# Patient Record
Sex: FEMALE | Race: WHITE | Employment: UNEMPLOYED | URBAN - METROPOLITAN AREA
[De-identification: names, ages, dates, MRNs, and addresses within clinical notes are randomized per-mention and may not be internally consistent; named-entity substitution may affect disease eponyms.]

---

## 2021-04-15 ENCOUNTER — OFFICE VISIT (OUTPATIENT)
Dept: FAMILY MEDICINE CLINIC | Age: 31
End: 2021-04-15
Payer: MEDICARE

## 2021-04-15 VITALS
SYSTOLIC BLOOD PRESSURE: 118 MMHG | TEMPERATURE: 97.9 F | HEART RATE: 75 BPM | OXYGEN SATURATION: 98 % | RESPIRATION RATE: 16 BRPM | HEIGHT: 62 IN | BODY MASS INDEX: 24.07 KG/M2 | DIASTOLIC BLOOD PRESSURE: 74 MMHG | WEIGHT: 130.8 LBS

## 2021-04-15 DIAGNOSIS — E55.9 VITAMIN D DEFICIENCY, UNSPECIFIED: ICD-10-CM

## 2021-04-15 DIAGNOSIS — Z12.4 SCREENING FOR CERVICAL CANCER: ICD-10-CM

## 2021-04-15 DIAGNOSIS — Z76.89 ENCOUNTER TO ESTABLISH CARE: Primary | ICD-10-CM

## 2021-04-15 DIAGNOSIS — G43.109 MIGRAINE WITH AURA AND WITHOUT STATUS MIGRAINOSUS, NOT INTRACTABLE: ICD-10-CM

## 2021-04-15 DIAGNOSIS — G89.29 CHRONIC LEFT SHOULDER PAIN: ICD-10-CM

## 2021-04-15 DIAGNOSIS — Z13.220 SCREENING FOR HYPERCHOLESTEROLEMIA: ICD-10-CM

## 2021-04-15 DIAGNOSIS — F17.200 TOBACCO DEPENDENCE: ICD-10-CM

## 2021-04-15 DIAGNOSIS — Z00.00 HEALTHCARE MAINTENANCE: ICD-10-CM

## 2021-04-15 DIAGNOSIS — L73.2 HIDRADENITIS SUPPURATIVA: ICD-10-CM

## 2021-04-15 DIAGNOSIS — M25.562 ACUTE PAIN OF LEFT KNEE: ICD-10-CM

## 2021-04-15 DIAGNOSIS — M25.512 CHRONIC LEFT SHOULDER PAIN: ICD-10-CM

## 2021-04-15 PROCEDURE — G8427 DOCREV CUR MEDS BY ELIG CLIN: HCPCS | Performed by: FAMILY MEDICINE

## 2021-04-15 PROCEDURE — 99204 OFFICE O/P NEW MOD 45 MIN: CPT | Performed by: FAMILY MEDICINE

## 2021-04-15 PROCEDURE — G8420 CALC BMI NORM PARAMETERS: HCPCS | Performed by: FAMILY MEDICINE

## 2021-04-15 PROCEDURE — 4004F PT TOBACCO SCREEN RCVD TLK: CPT | Performed by: FAMILY MEDICINE

## 2021-04-15 RX ORDER — IBUPROFEN 800 MG/1
800 TABLET ORAL EVERY 6 HOURS PRN
COMMUNITY
End: 2021-04-15 | Stop reason: SDUPTHER

## 2021-04-15 RX ORDER — IBUPROFEN 800 MG/1
800 TABLET ORAL EVERY 12 HOURS PRN
Qty: 120 TABLET | Refills: 0 | Status: SHIPPED
Start: 2021-04-15 | End: 2022-05-18 | Stop reason: SDUPTHER

## 2021-04-15 SDOH — ECONOMIC STABILITY: FOOD INSECURITY: WITHIN THE PAST 12 MONTHS, THE FOOD YOU BOUGHT JUST DIDN'T LAST AND YOU DIDN'T HAVE MONEY TO GET MORE.: NEVER TRUE

## 2021-04-15 ASSESSMENT — PATIENT HEALTH QUESTIONNAIRE - PHQ9
1. LITTLE INTEREST OR PLEASURE IN DOING THINGS: 0
SUM OF ALL RESPONSES TO PHQ QUESTIONS 1-9: 0
2. FEELING DOWN, DEPRESSED OR HOPELESS: 0
SUM OF ALL RESPONSES TO PHQ9 QUESTIONS 1 & 2: 0
SUM OF ALL RESPONSES TO PHQ QUESTIONS 1-9: 0
SUM OF ALL RESPONSES TO PHQ QUESTIONS 1-9: 0

## 2021-04-15 NOTE — PROGRESS NOTES
CC: Raul Walker is a 27 y.o. yo female here for evaluation of the following medical concerns: New Patient      HPI:    Establish care    Migraines with aura: 2-3x per month, goes behind the eye and temple and in the neck and shoots up to the eyes; can last for 1 day but do resolve with motrin; no photo/phonophobia; +light sensitivity  Tobacco use: Tried wellbutrin and chantix (had allergic reaction)   L shoudler tendonitis  L knee pain, swelling    ROS negative unless otherwise noted    Vitals:  /74   Pulse 75   Temp 97.9 °F (36.6 °C)   Resp 16   Ht 5' 2\" (1.575 m)   Wt 130 lb 12.8 oz (59.3 kg)   SpO2 98%   BMI 23.92 kg/m²   Wt Readings from Last 3 Encounters:   04/15/21 130 lb 12.8 oz (59.3 kg)       Physical Exam:  Constitutional - alert, well appearing, and in no distress  Eyes - extraocular eye movements intact, left eye normal, right eye normal, no conjunctivitis noted  Neck - supple, no significant adenopathy; thyroid exam: thyroid is normal in size without nodules or tenderness  Respiratory- clear to auscultation, no wheezes, rales or rhonchi, symmetric air entry; no increased work of breathing  Cardiovascular - normal rate, regular rhythm, normal S1, S2, no murmurs, rubs, clicks or gallops; Extremities - no edema noted  Abdomen - soft, nontender, nondistended  Musculoskeletal - gait normal; no clubbing, cyanosis of extremities noted; no joint deformity or swelling noted  Skin - normal coloration and turgor, no rashes, no suspicious skin lesions noted  Neurological - alert, oriented; no obvious CN deficits, normal speech, no obvious focal findings noted  Psychiatric - normal mood, behavior, speech, dress    Assessment / Plan   Diagnosis Orders   1. Encounter to establish care     2.  Migraine with aura and without status migrainosus, not intractable  ibuprofen (ADVIL;MOTRIN) 800 MG tablet    Riboflavin-Magnesium-Feverfew 100-90-25 MG TABS    CBC Auto Differential    Comprehensive Metabolic Panel    TSH without Reflex   3. Hidradenitis suppurativa  External Referral To Dermatology   4. Acute pain of left knee  XR KNEE LEFT (MIN 4 VIEWS)    XR SHOULDER LEFT (MIN 2 VIEWS)   5. Chronic left shoulder pain  XR SHOULDER LEFT (MIN 2 VIEWS)   6. Tobacco dependence     7. Screening for hypercholesterolemia  Lipid Panel   8. Vitamin D deficiency, unspecified  Vitamin D 25 Hydroxy   9. Healthcare maintenance  HEPATITIS C ANTIBODY    HIV-1 AND HIV-2 ANTIBODIES   10. Screening for cervical cancer  Rose Mary Mauro MD, OB/GYN, [place] ([facility])       Labs as ordered  Mg/b2 for migraine prevention; motrin for abortive  Xray shoulder and knee; recs to follow      RTO: Return in about 3 months (around 7/15/2021) for Test Results; knee and shoulder xray and chronic pain.         An electronic signature was used to authenticate this note.  ---- Deyanira Martines MD on 4/15/2021 at 5:29 PM

## 2021-06-14 DIAGNOSIS — G43.109 MIGRAINE WITH AURA AND WITHOUT STATUS MIGRAINOSUS, NOT INTRACTABLE: ICD-10-CM

## 2021-06-14 RX ORDER — IBUPROFEN 800 MG/1
800 TABLET ORAL EVERY 12 HOURS PRN
Qty: 60 TABLET | Refills: 1 | OUTPATIENT
Start: 2021-06-14

## 2021-10-12 ENCOUNTER — TELEPHONE (OUTPATIENT)
Dept: FAMILY MEDICINE CLINIC | Age: 31
End: 2021-10-12

## 2021-10-12 DIAGNOSIS — L73.2 HIDRADENITIS SUPPURATIVA: Primary | ICD-10-CM

## 2021-10-12 RX ORDER — SULFAMETHOXAZOLE AND TRIMETHOPRIM 800; 160 MG/1; MG/1
1 TABLET ORAL 2 TIMES DAILY
Qty: 14 TABLET | Refills: 0 | Status: SHIPPED | OUTPATIENT
Start: 2021-10-12 | End: 2021-10-19

## 2022-05-18 DIAGNOSIS — L73.2 HIDRADENITIS SUPPURATIVA: ICD-10-CM

## 2022-05-18 DIAGNOSIS — G43.109 MIGRAINE WITH AURA AND WITHOUT STATUS MIGRAINOSUS, NOT INTRACTABLE: ICD-10-CM

## 2022-05-18 RX ORDER — IBUPROFEN 800 MG/1
800 TABLET ORAL EVERY 12 HOURS PRN
Qty: 120 TABLET | Refills: 0 | Status: SHIPPED
Start: 2022-05-18 | End: 2022-09-23 | Stop reason: SDUPTHER

## 2022-06-14 ENCOUNTER — OFFICE VISIT (OUTPATIENT)
Dept: FAMILY MEDICINE CLINIC | Age: 32
End: 2022-06-14
Payer: MEDICARE

## 2022-06-14 VITALS
SYSTOLIC BLOOD PRESSURE: 102 MMHG | OXYGEN SATURATION: 99 % | HEART RATE: 78 BPM | TEMPERATURE: 98.4 F | BODY MASS INDEX: 22.23 KG/M2 | HEIGHT: 62 IN | DIASTOLIC BLOOD PRESSURE: 64 MMHG | WEIGHT: 120.8 LBS

## 2022-06-14 DIAGNOSIS — E55.9 VITAMIN D DEFICIENCY, UNSPECIFIED: ICD-10-CM

## 2022-06-14 DIAGNOSIS — R53.83 FATIGUE, UNSPECIFIED TYPE: ICD-10-CM

## 2022-06-14 DIAGNOSIS — Z11.1 SCREENING FOR TUBERCULOSIS: ICD-10-CM

## 2022-06-14 DIAGNOSIS — Z00.00 ENCOUNTER FOR PREVENTIVE CARE: Primary | ICD-10-CM

## 2022-06-14 DIAGNOSIS — Z12.4 SCREENING FOR CERVICAL CANCER: ICD-10-CM

## 2022-06-14 DIAGNOSIS — Z01.84 IMMUNITY STATUS TESTING: ICD-10-CM

## 2022-06-14 PROCEDURE — 99395 PREV VISIT EST AGE 18-39: CPT | Performed by: FAMILY MEDICINE

## 2022-06-14 SDOH — ECONOMIC STABILITY: FOOD INSECURITY: WITHIN THE PAST 12 MONTHS, YOU WORRIED THAT YOUR FOOD WOULD RUN OUT BEFORE YOU GOT MONEY TO BUY MORE.: NEVER TRUE

## 2022-06-14 SDOH — ECONOMIC STABILITY: FOOD INSECURITY: WITHIN THE PAST 12 MONTHS, THE FOOD YOU BOUGHT JUST DIDN'T LAST AND YOU DIDN'T HAVE MONEY TO GET MORE.: NEVER TRUE

## 2022-06-14 ASSESSMENT — SOCIAL DETERMINANTS OF HEALTH (SDOH): HOW HARD IS IT FOR YOU TO PAY FOR THE VERY BASICS LIKE FOOD, HOUSING, MEDICAL CARE, AND HEATING?: NOT VERY HARD

## 2022-06-14 ASSESSMENT — PATIENT HEALTH QUESTIONNAIRE - PHQ9
2. FEELING DOWN, DEPRESSED OR HOPELESS: 0
SUM OF ALL RESPONSES TO PHQ QUESTIONS 1-9: 0
SUM OF ALL RESPONSES TO PHQ QUESTIONS 1-9: 0
1. LITTLE INTEREST OR PLEASURE IN DOING THINGS: 0
SUM OF ALL RESPONSES TO PHQ QUESTIONS 1-9: 0
SUM OF ALL RESPONSES TO PHQ QUESTIONS 1-9: 0
SUM OF ALL RESPONSES TO PHQ9 QUESTIONS 1 & 2: 0

## 2022-06-14 NOTE — PROGRESS NOTES
CC: Devika Jain is a 32 y.o. 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)      HPI:      Starting nursing schools; required titers and vaccines  Migraines with aura: 2-3x per month  Tobacco use: Tried wellbutrin and chantix (had allergic reaction); some day smoker    Adult Screening:   Blood pressure normotensive: Yes    Obesity (BMI 30+): No   Diabetes screen: NA    Statin for CVD events and mortality preventive medication: see orders   Aspirin for CVD and colon cancer preventive medication: see orders   Diet to prevent CV disease (adults with cardiovascular risk factors): NA; Patient's cardiovascular risk history includes: none   Alcohol use:   reports no history of alcohol use.  Tobacco abuse:   reports that she has been smoking. She has been smoking about 0.50 packs per day. She has never used smokeless tobacco.  Depression screening: PHQ-9 Total Score: 0 (6/14/2022 11:00 AM)   Annual lung cancer screening: NA    Colorectal Cancer Screening, average risk (50-75yrs): NA    Hx of CRC before age 57yrs in FDR: NA    Hep C virus infection screening: NA    Fall prevention: NA     Sexually active specific:    Sexual activity: not currently; High risk behavior: none,  Sexually transmitted infections counseling: no    Female specific:   Folate supplementation (reproductive age): not on prenatal   Intimate partner violence concerns (reproductive age): safe    Cervical Cancer Screening (21 - 72 years): not UTD;  Hx abnormal PAP: no   BRCA risk assessment and genetic counseling/testing: no personal or family hx of breast, ovarian, tubal or peritoneal ca or ancestry associated with BRCA1/2 gene mutations    Breast Cancer Screening: NA    Osteoporosis screening in postmenopausal women younger than 72 years at increased risk of osteoporosis / 10-year FRAX risk of major osteoporotic fracture (MOF) (without DXA) greater than that of a 51-year-old white woman without major risk factors (8.4%): NA    Osteoporosis screening: women 72 years and older: NA         Health Maintenance Due   Topic Date Due    COVID-19 Vaccine (1) Never done    Pneumococcal 0-64 years Vaccine (1 - PCV) Never done    HIV screen  Never done    Hepatitis C screen  Never done    Cervical cancer screen  Never done     Immunization History   Administered Date(s) Administered    Tdap (Boostrix, Adacel) 06/15/2020       History reviewed. No pertinent past medical history. History reviewed. No pertinent surgical history. Family History   Problem Relation Age of Onset    Diabetes type 2  Mother     Cancer Father         tongue; non smoker     Social History     Tobacco Use    Smoking status: Current Some Day Smoker     Packs/day: 0.50    Smokeless tobacco: Never Used   Substance Use Topics    Alcohol use: Never    Drug use: Never     Allergies   Allergen Reactions    Chantix [Varenicline]      Prior to Admission medications    Medication Sig Start Date End Date Taking?  Authorizing Provider   ibuprofen (ADVIL;MOTRIN) 800 MG tablet Take 1 tablet by mouth every 12 hours as needed for Pain 5/18/22  Yes Nhung Gardiner MD       Vitals:  /64 (Site: Left Upper Arm, Position: Sitting, Cuff Size: Medium Adult)   Pulse 78   Temp 98.4 °F (36.9 °C) (Temporal)   Ht 5' 1.5\" (1.562 m)   Wt 120 lb 12.8 oz (54.8 kg)   LMP 05/25/2022 (Approximate)   SpO2 99%   BMI 22.46 kg/m²   Wt Readings from Last 3 Encounters:   06/14/22 120 lb 12.8 oz (54.8 kg)   04/15/21 130 lb 12.8 oz (59.3 kg)       Physical Exam:  Constitutional - alert, well appearing, and in no distress  Eyes - extraocular eye movements intact, left eye normal, right eye normal, no conjunctivitis noted  Neck - supple, no significant adenopathy; thyroid exam: thyroid is normal in size without nodules or tenderness  Respiratory- clear to auscultation, no wheezes, rales or rhonchi, symmetric air entry; no

## 2022-06-15 DIAGNOSIS — R53.83 FATIGUE, UNSPECIFIED TYPE: ICD-10-CM

## 2022-06-15 DIAGNOSIS — Z11.1 SCREENING FOR TUBERCULOSIS: ICD-10-CM

## 2022-06-15 DIAGNOSIS — Z01.84 IMMUNITY STATUS TESTING: ICD-10-CM

## 2022-06-15 DIAGNOSIS — E55.9 VITAMIN D DEFICIENCY, UNSPECIFIED: ICD-10-CM

## 2022-06-15 DIAGNOSIS — Z00.00 ENCOUNTER FOR PREVENTIVE CARE: ICD-10-CM

## 2022-06-15 LAB
ALBUMIN SERPL-MCNC: 4.3 G/DL (ref 3.5–5.2)
ALP BLD-CCNC: 79 U/L (ref 35–104)
ALT SERPL-CCNC: 18 U/L (ref 0–32)
ANION GAP SERPL CALCULATED.3IONS-SCNC: 12 MMOL/L (ref 7–16)
AST SERPL-CCNC: 17 U/L (ref 0–31)
BASOPHILS ABSOLUTE: 0.04 E9/L (ref 0–0.2)
BASOPHILS RELATIVE PERCENT: 0.6 % (ref 0–2)
BILIRUB SERPL-MCNC: <0.2 MG/DL (ref 0–1.2)
BUN BLDV-MCNC: 6 MG/DL (ref 6–20)
CALCIUM SERPL-MCNC: 9.2 MG/DL (ref 8.6–10.2)
CHLORIDE BLD-SCNC: 103 MMOL/L (ref 98–107)
CHOLESTEROL, TOTAL: 231 MG/DL (ref 0–199)
CO2: 22 MMOL/L (ref 22–29)
CREAT SERPL-MCNC: 0.8 MG/DL (ref 0.5–1)
EOSINOPHILS ABSOLUTE: 0.3 E9/L (ref 0.05–0.5)
EOSINOPHILS RELATIVE PERCENT: 4.2 % (ref 0–6)
GFR AFRICAN AMERICAN: >60
GFR NON-AFRICAN AMERICAN: >60 ML/MIN/1.73
GLUCOSE BLD-MCNC: 94 MG/DL (ref 74–99)
HCT VFR BLD CALC: 42 % (ref 34–48)
HDLC SERPL-MCNC: 39 MG/DL
HEMOGLOBIN: 13.8 G/DL (ref 11.5–15.5)
IMMATURE GRANULOCYTES #: 0.02 E9/L
IMMATURE GRANULOCYTES %: 0.3 % (ref 0–5)
LDL CHOLESTEROL CALCULATED: 154 MG/DL (ref 0–99)
LYMPHOCYTES ABSOLUTE: 2.42 E9/L (ref 1.5–4)
LYMPHOCYTES RELATIVE PERCENT: 33.9 % (ref 20–42)
MCH RBC QN AUTO: 30.9 PG (ref 26–35)
MCHC RBC AUTO-ENTMCNC: 32.9 % (ref 32–34.5)
MCV RBC AUTO: 94 FL (ref 80–99.9)
MONOCYTES ABSOLUTE: 0.58 E9/L (ref 0.1–0.95)
MONOCYTES RELATIVE PERCENT: 8.1 % (ref 2–12)
NEUTROPHILS ABSOLUTE: 3.77 E9/L (ref 1.8–7.3)
NEUTROPHILS RELATIVE PERCENT: 52.9 % (ref 43–80)
PDW BLD-RTO: 11.9 FL (ref 11.5–15)
PLATELET # BLD: 260 E9/L (ref 130–450)
PMV BLD AUTO: 11.4 FL (ref 7–12)
POTASSIUM SERPL-SCNC: 3.7 MMOL/L (ref 3.5–5)
RBC # BLD: 4.47 E12/L (ref 3.5–5.5)
SODIUM BLD-SCNC: 137 MMOL/L (ref 132–146)
TOTAL PROTEIN: 6.9 G/DL (ref 6.4–8.3)
TRIGL SERPL-MCNC: 190 MG/DL (ref 0–149)
TSH SERPL DL<=0.05 MIU/L-ACNC: 2.62 UIU/ML (ref 0.27–4.2)
VITAMIN D 25-HYDROXY: 23 NG/ML (ref 30–100)
VLDLC SERPL CALC-MCNC: 38 MG/DL
WBC # BLD: 7.1 E9/L (ref 4.5–11.5)

## 2022-06-16 DIAGNOSIS — E55.9 VITAMIN D DEFICIENCY, UNSPECIFIED: Primary | ICD-10-CM

## 2022-06-16 LAB
HBV SURFACE AB TITR SER: REACTIVE {TITER}
HEPATITIS C ANTIBODY INTERPRETATION: NORMAL
HIV-1 AND HIV-2 ANTIBODIES: NORMAL
VARICELLA-ZOSTER VIRUS AB, IGG: NORMAL

## 2022-06-16 RX ORDER — ERGOCALCIFEROL 1.25 MG/1
50000 CAPSULE ORAL WEEKLY
Qty: 12 CAPSULE | Refills: 1 | Status: SHIPPED
Start: 2022-06-16 | End: 2022-09-23 | Stop reason: SDUPTHER

## 2022-06-17 LAB
MEASLES IMMUNE (IGG): NORMAL
MUMPS AB IGG: NORMAL
RUBELLA ANTIBODY IGG: NORMAL

## 2022-06-20 LAB
COMMENT: NORMAL
REPORT: NORMAL

## 2022-07-12 DIAGNOSIS — G43.109 MIGRAINE WITH AURA AND WITHOUT STATUS MIGRAINOSUS, NOT INTRACTABLE: ICD-10-CM

## 2022-07-12 RX ORDER — IBUPROFEN 800 MG/1
800 TABLET ORAL EVERY 12 HOURS PRN
Qty: 60 TABLET | Refills: 1 | OUTPATIENT
Start: 2022-07-12

## 2022-09-23 ENCOUNTER — OFFICE VISIT (OUTPATIENT)
Dept: FAMILY MEDICINE CLINIC | Age: 32
End: 2022-09-23
Payer: MEDICARE

## 2022-09-23 VITALS
SYSTOLIC BLOOD PRESSURE: 98 MMHG | WEIGHT: 124 LBS | OXYGEN SATURATION: 99 % | HEART RATE: 77 BPM | BODY MASS INDEX: 22.82 KG/M2 | TEMPERATURE: 97.5 F | HEIGHT: 62 IN | DIASTOLIC BLOOD PRESSURE: 64 MMHG

## 2022-09-23 DIAGNOSIS — F17.200 TOBACCO DEPENDENCE: ICD-10-CM

## 2022-09-23 DIAGNOSIS — G43.109 MIGRAINE WITH AURA AND WITHOUT STATUS MIGRAINOSUS, NOT INTRACTABLE: Primary | ICD-10-CM

## 2022-09-23 DIAGNOSIS — E55.9 VITAMIN D DEFICIENCY, UNSPECIFIED: ICD-10-CM

## 2022-09-23 DIAGNOSIS — L73.2 HIDRADENITIS SUPPURATIVA: ICD-10-CM

## 2022-09-23 PROCEDURE — G8427 DOCREV CUR MEDS BY ELIG CLIN: HCPCS | Performed by: FAMILY MEDICINE

## 2022-09-23 PROCEDURE — G8420 CALC BMI NORM PARAMETERS: HCPCS | Performed by: FAMILY MEDICINE

## 2022-09-23 PROCEDURE — 99214 OFFICE O/P EST MOD 30 MIN: CPT | Performed by: FAMILY MEDICINE

## 2022-09-23 PROCEDURE — 4004F PT TOBACCO SCREEN RCVD TLK: CPT | Performed by: FAMILY MEDICINE

## 2022-09-23 RX ORDER — METHYLPREDNISOLONE 4 MG/1
TABLET ORAL
Qty: 1 KIT | Refills: 0 | Status: SHIPPED | OUTPATIENT
Start: 2022-09-23

## 2022-09-23 RX ORDER — KETOROLAC TROMETHAMINE 30 MG/ML
30 INJECTION, SOLUTION INTRAMUSCULAR; INTRAVENOUS ONCE
Status: COMPLETED | OUTPATIENT
Start: 2022-09-23 | End: 2022-09-23

## 2022-09-23 RX ORDER — IBUPROFEN 800 MG/1
800 TABLET ORAL EVERY 12 HOURS PRN
Qty: 120 TABLET | Refills: 1 | Status: SHIPPED | OUTPATIENT
Start: 2022-09-23

## 2022-09-23 RX ORDER — SUMATRIPTAN 50 MG/1
50 TABLET, FILM COATED ORAL
Qty: 9 TABLET | Refills: 3 | Status: SHIPPED | OUTPATIENT
Start: 2022-09-23 | End: 2022-09-23

## 2022-09-23 RX ORDER — DIPHENHYDRAMINE HCL 25 MG
25 CAPSULE ORAL EVERY 6 HOURS PRN
Qty: 24 CAPSULE | Refills: 0 | Status: SHIPPED | OUTPATIENT
Start: 2022-09-23 | End: 2022-09-29

## 2022-09-23 RX ORDER — SULFAMETHOXAZOLE AND TRIMETHOPRIM 800; 160 MG/1; MG/1
1 TABLET ORAL 2 TIMES DAILY
Qty: 20 TABLET | Refills: 0 | Status: SHIPPED | OUTPATIENT
Start: 2022-09-23 | End: 2022-10-03

## 2022-09-23 RX ORDER — ERGOCALCIFEROL 1.25 MG/1
50000 CAPSULE ORAL WEEKLY
Qty: 12 CAPSULE | Refills: 1 | Status: SHIPPED | OUTPATIENT
Start: 2022-09-23

## 2022-09-23 RX ADMIN — KETOROLAC TROMETHAMINE 30 MG: 30 INJECTION, SOLUTION INTRAMUSCULAR; INTRAVENOUS at 08:36

## 2022-09-23 NOTE — PROGRESS NOTES
CC: Emiliana Norwood is a 28 y.o. yo female is here for evaluation evaluation for the following acute & chronic medical concerns: Migraine (worsening), Fatigue (Pt states she just feels exhausted even after a good night of sleep), and Nicotine Dependence (Pt wants to stop smoking)      HPI:    Fatigue; even with sleeping all day she feels tired in the am    Migraines with aura: normally they would occur 2-3x per month but lately they have been worsening; now they are occurring almost daily and seem to never fully resolve with ibuprofen; the still feel like her normal migraine but have changed in that ibuprofen is not as helpful as previous; ex:  in past would last 1-2 days but now they keep persisting; this has been going on since the summer for 3 months; Mg/B2 does not help her migraines but helps her with BM so she would like to continue this    Flare of hydradenitis sup on the L groin region    Tobacco use: in past tried wellbutrin and chantix (had allergic reaction); some day smoker      Vitals:   BP 98/64   Pulse 77   Temp 97.5 °F (36.4 °C) (Temporal)   Ht 5' 1.5\" (1.562 m)   Wt 124 lb (56.2 kg)   LMP 09/17/2022 (Exact Date)   SpO2 99%   BMI 23.05 kg/m²   Wt Readings from Last 3 Encounters:   09/23/22 124 lb (56.2 kg)   06/14/22 120 lb 12.8 oz (54.8 kg)   04/15/21 130 lb 12.8 oz (59.3 kg)       PE:  Constitutional - alert, well appearing, and in no distress  Eyes - extraocular eye movements intact, left eye normal, right eye normal, no conjunctivitis noted  Neck - symmetric, no obvious masses noted  Respiratory- clear to auscultation, no wheezes, rales or rhonchi, symmetric air entry; no increased work of breathing  Cardiovascular - normal rate, regular rhythm, normal S1, S2, no murmurs, rubs, clicks or gallops  Extremities - no edema noted  Abdomen - soft, nontender, nondistended  Skin - L groin and near buttock with erythematous pustules / inflamed non-drainable   Neurological - no obvious CN deficits or focal neurological deficits  Psychiatric - alert, oriented; normal mood, behavior, speech, dress    A / P:     Diagnosis Orders   1. Migraine with aura and without status migrainosus, not intractable  ibuprofen (ADVIL;MOTRIN) 800 MG tablet    Riboflavin-Magnesium-Feverfew 100-90-25 MG TABS    ketorolac (TORADOL) injection 30 mg    methylPREDNISolone (MEDROL DOSEPACK) 4 MG tablet    diphenhydrAMINE (BENADRYL) 25 MG capsule    SUMAtriptan (IMITREX) 50 MG tablet      2. Hidradenitis suppurativa  sulfamethoxazole-trimethoprim (BACTRIM DS) 800-160 MG per tablet      3. Vitamin D deficiency, unspecified  vitamin D (ERGOCALCIFEROL) 1.25 MG (68762 UT) CAPS capsule      4. Tobacco dependence  Internal Referral To Smoking Cessation Program ([facility])          Toradol inj today + medrol dose + benadryl for acute migraine flare  Continue ibuprofen PRN but can use imitrex for severe HA  Consider rebound; discuss at fu  Continue mg to help with stool but consider this as cause of fatigue  Can do trial of Bcomplex for now; we will discuss further at f/u visit  Bactrim to use if flare of HS does not resolve on own by the weekened  Tobacco cessation referral      RTO: Return in about 4 months (around 1/23/2023) for Medication check.         An electronic signature was used to authenticate this note.  ---- Alireza Loja MD on 9/23/2022 at 1:04 PM

## 2022-09-28 DIAGNOSIS — G43.109 MIGRAINE WITH AURA AND WITHOUT STATUS MIGRAINOSUS, NOT INTRACTABLE: ICD-10-CM

## 2022-09-29 NOTE — TELEPHONE ENCOUNTER
Medication Refill Request    LOV 9/23/2022  NOV 1/27/2023    Lab Results   Component Value Date    CREATININE 0.8 06/15/2022

## 2022-12-28 DIAGNOSIS — G43.109 MIGRAINE WITH AURA AND WITHOUT STATUS MIGRAINOSUS, NOT INTRACTABLE: ICD-10-CM

## 2022-12-28 RX ORDER — B2/MAGNESIUM CIT,OXID/FEVERFEW 200-180-50
TABLET ORAL
Qty: 46 TABLET | Refills: 1 | OUTPATIENT
Start: 2022-12-28

## 2023-02-21 DIAGNOSIS — E55.9 VITAMIN D DEFICIENCY, UNSPECIFIED: ICD-10-CM

## 2023-02-21 RX ORDER — ERGOCALCIFEROL 1.25 MG/1
CAPSULE ORAL
Qty: 12 CAPSULE | Refills: 1 | OUTPATIENT
Start: 2023-02-21

## 2023-04-11 DIAGNOSIS — E55.9 VITAMIN D DEFICIENCY, UNSPECIFIED: ICD-10-CM

## 2023-04-12 RX ORDER — ERGOCALCIFEROL 1.25 MG/1
50000 CAPSULE ORAL WEEKLY
Qty: 12 CAPSULE | Refills: 1 | OUTPATIENT
Start: 2023-04-12

## 2023-04-18 ENCOUNTER — OFFICE VISIT (OUTPATIENT)
Dept: FAMILY MEDICINE CLINIC | Age: 33
End: 2023-04-18

## 2023-04-18 VITALS
BODY MASS INDEX: 24.17 KG/M2 | WEIGHT: 130 LBS | SYSTOLIC BLOOD PRESSURE: 98 MMHG | OXYGEN SATURATION: 98 % | DIASTOLIC BLOOD PRESSURE: 62 MMHG | TEMPERATURE: 97.6 F | HEART RATE: 71 BPM

## 2023-04-18 DIAGNOSIS — E78.5 HYPERLIPIDEMIA, UNSPECIFIED HYPERLIPIDEMIA TYPE: Primary | ICD-10-CM

## 2023-04-18 DIAGNOSIS — Z00.00 ENCOUNTER FOR PREVENTIVE CARE: ICD-10-CM

## 2023-04-18 DIAGNOSIS — G43.109 MIGRAINE WITH AURA AND WITHOUT STATUS MIGRAINOSUS, NOT INTRACTABLE: ICD-10-CM

## 2023-04-18 DIAGNOSIS — E55.9 VITAMIN D DEFICIENCY, UNSPECIFIED: ICD-10-CM

## 2023-04-18 DIAGNOSIS — Z11.1 SCREENING FOR TUBERCULOSIS: ICD-10-CM

## 2023-04-18 RX ORDER — ERGOCALCIFEROL 1.25 MG/1
50000 CAPSULE ORAL WEEKLY
Qty: 12 CAPSULE | Refills: 1 | Status: CANCELLED | OUTPATIENT
Start: 2023-04-18

## 2023-04-18 SDOH — ECONOMIC STABILITY: HOUSING INSECURITY
IN THE LAST 12 MONTHS, WAS THERE A TIME WHEN YOU DID NOT HAVE A STEADY PLACE TO SLEEP OR SLEPT IN A SHELTER (INCLUDING NOW)?: NO

## 2023-04-18 SDOH — ECONOMIC STABILITY: INCOME INSECURITY: HOW HARD IS IT FOR YOU TO PAY FOR THE VERY BASICS LIKE FOOD, HOUSING, MEDICAL CARE, AND HEATING?: NOT VERY HARD

## 2023-04-18 SDOH — ECONOMIC STABILITY: FOOD INSECURITY: WITHIN THE PAST 12 MONTHS, YOU WORRIED THAT YOUR FOOD WOULD RUN OUT BEFORE YOU GOT MONEY TO BUY MORE.: NEVER TRUE

## 2023-04-18 SDOH — ECONOMIC STABILITY: FOOD INSECURITY: WITHIN THE PAST 12 MONTHS, THE FOOD YOU BOUGHT JUST DIDN'T LAST AND YOU DIDN'T HAVE MONEY TO GET MORE.: NEVER TRUE

## 2023-04-18 ASSESSMENT — PATIENT HEALTH QUESTIONNAIRE - PHQ9
1. LITTLE INTEREST OR PLEASURE IN DOING THINGS: 0
SUM OF ALL RESPONSES TO PHQ QUESTIONS 1-9: 0
SUM OF ALL RESPONSES TO PHQ QUESTIONS 1-9: 0
SUM OF ALL RESPONSES TO PHQ9 QUESTIONS 1 & 2: 0
SUM OF ALL RESPONSES TO PHQ QUESTIONS 1-9: 0
SUM OF ALL RESPONSES TO PHQ QUESTIONS 1-9: 0
2. FEELING DOWN, DEPRESSED OR HOPELESS: 0

## 2023-04-18 ASSESSMENT — LIFESTYLE VARIABLES
HOW MANY STANDARD DRINKS CONTAINING ALCOHOL DO YOU HAVE ON A TYPICAL DAY: PATIENT DOES NOT DRINK
HOW OFTEN DO YOU HAVE A DRINK CONTAINING ALCOHOL: NEVER

## 2023-04-18 NOTE — PROGRESS NOTES
CC: Jonathan Campos is a 28 y.o. 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  Migraines with aura: 2-3x per month; trial of imitrex did not work; back on motrin PRN  Restless leg: vitamin D does help her symptoms  Tobacco use: Tried wellbutrin and chantix (had allergic reaction); some day smoker      Vitals:   BP 98/62   Pulse 71   Temp 97.6 °F (36.4 °C) (Temporal)   Wt 130 lb (59 kg)   SpO2 98%   BMI 24.17 kg/m²   Wt Readings from Last 3 Encounters:   04/18/23 130 lb (59 kg)   09/23/22 124 lb (56.2 kg)   06/14/22 120 lb 12.8 oz (54.8 kg)       PE:  Constitutional - alert, well appearing, and in no distress  Eyes - extraocular eye movements intact, left eye normal, right eye normal, no conjunctivitis noted  Neck - symmetric, no obvious masses noted  Respiratory- clear to auscultation, no wheezes, rales or rhonchi, symmetric air entry; no increased work of breathing  Cardiovascular - normal rate, regular rhythm, normal S1, S2, no murmurs, rubs, clicks or gallops  Extremities - no edema noted  Abdomen - soft, nontender, nondistended      A / P:     Diagnosis Orders   1. Hyperlipidemia, unspecified hyperlipidemia type  Lipid Panel    CBC with Auto Differential    Comprehensive Metabolic Panel    TSH    Urinalysis      2. Migraine with aura and without status migrainosus, not intractable  Rimegepant Sulfate 75 MG TBDP      3. Vitamin D deficiency, unspecified  Vitamin D 25 Hydroxy      4. Encounter for preventive care  Lipid Panel    CBC with Auto Differential    Comprehensive Metabolic Panel    TSH    Urinalysis      5.  Screening for tuberculosis  T-Spot TB Test            Trial of nurtec prevention dosing 75mg every other day  Continue ibuprofen PRN  Tobacco cessation referral      RTO: Return in about 4 months (around

## 2023-04-24 ENCOUNTER — PATIENT MESSAGE (OUTPATIENT)
Dept: FAMILY MEDICINE CLINIC | Age: 33
End: 2023-04-24

## 2023-04-24 DIAGNOSIS — R31.9 HEMATURIA, UNSPECIFIED TYPE: Primary | ICD-10-CM

## 2023-04-24 DIAGNOSIS — E78.5 HYPERLIPIDEMIA, UNSPECIFIED HYPERLIPIDEMIA TYPE: ICD-10-CM

## 2023-04-24 RX ORDER — SULFAMETHOXAZOLE AND TRIMETHOPRIM 800; 160 MG/1; MG/1
1 TABLET ORAL 2 TIMES DAILY
Qty: 6 TABLET | Refills: 0 | Status: SHIPPED | OUTPATIENT
Start: 2023-04-24 | End: 2023-04-27

## 2023-04-25 NOTE — TELEPHONE ENCOUNTER
From: Kari Chaudhary  To: Dr. Fiorella Mercedes: 4/24/2023 4:16 PM EDT  Subject: Follow up    Hi! I saw the missed calls but I usually wait for the voicemail. I saved the number so there should not be an issue anymore. Ill make a new appointment for next month. Should I get the blood work done before coming in and can I also get a copy of my tb blood work for school. Thank you!

## 2023-04-25 NOTE — TELEPHONE ENCOUNTER
Patient was notified of results:    Result Care Coordination      Result Notes     Kitantolin Oswaldo Kowalski   4/24/2023  4:27 PM EDT Back to Top      Pt informed and verbally understands results and repeat lab/urine in 1 month    Joshua Estes MD   4/24/2023  4:06 PM EDT       Please call pt. I've been trying to reach pt x2; goes to voicemail. LDL is very high; we will need to repeat this. It appears you do have a UTI; we normally do not treat this unless you have symtpoms but since there is slight blood in the urine and significant white blood cells, we will treat for a UTI. Abx (bactrim sent over). I'd like to repeat the urine in 1 month with the fasting lipid panel. Do not provide urine when menstruating. Patient Communication     Append Comments   Seen Back to Top    I've been trying to reach you by phone but it goes to voicemail. LDL is very high; we will need to repeat this.

## 2023-05-07 DIAGNOSIS — G43.109 MIGRAINE WITH AURA AND WITHOUT STATUS MIGRAINOSUS, NOT INTRACTABLE: ICD-10-CM

## 2023-05-08 RX ORDER — IBUPROFEN 800 MG/1
800 TABLET ORAL EVERY 12 HOURS PRN
Qty: 60 TABLET | Refills: 3 | OUTPATIENT
Start: 2023-05-08

## 2023-10-14 DIAGNOSIS — G43.109 MIGRAINE WITH AURA AND WITHOUT STATUS MIGRAINOSUS, NOT INTRACTABLE: ICD-10-CM

## 2023-10-16 NOTE — TELEPHONE ENCOUNTER
Medication Refill Request    LOV 4/18/2023  NOV Visit date not found    Lab Results   Component Value Date    CREATININE 0.7 04/18/2023     Pt request

## 2024-01-21 DIAGNOSIS — G43.109 MIGRAINE WITH AURA AND WITHOUT STATUS MIGRAINOSUS, NOT INTRACTABLE: ICD-10-CM

## 2024-01-22 RX ORDER — RIMEGEPANT SULFATE 75 MG/75MG
1 TABLET, ORALLY DISINTEGRATING ORAL EVERY OTHER DAY
Qty: 8 TABLET | Refills: 5 | OUTPATIENT
Start: 2024-01-22

## 2024-01-24 DIAGNOSIS — G43.109 MIGRAINE WITH AURA AND WITHOUT STATUS MIGRAINOSUS, NOT INTRACTABLE: ICD-10-CM

## 2024-01-24 NOTE — TELEPHONE ENCOUNTER
Medication Refill Request    LOV 4/18/2023  NOV Visit date not found    Lab Results   Component Value Date    CREATININE 0.7 04/18/2023

## 2024-05-04 DIAGNOSIS — G43.109 MIGRAINE WITH AURA AND WITHOUT STATUS MIGRAINOSUS, NOT INTRACTABLE: ICD-10-CM

## 2024-05-05 DIAGNOSIS — G43.109 MIGRAINE WITH AURA AND WITHOUT STATUS MIGRAINOSUS, NOT INTRACTABLE: ICD-10-CM

## 2024-05-05 RX ORDER — RIMEGEPANT SULFATE 75 MG/75MG
1 TABLET, ORALLY DISINTEGRATING ORAL EVERY OTHER DAY
Qty: 45 TABLET | Refills: 0 | OUTPATIENT
Start: 2024-05-05

## 2024-06-10 ENCOUNTER — PATIENT MESSAGE (OUTPATIENT)
Dept: FAMILY MEDICINE CLINIC | Age: 34
End: 2024-06-10

## 2024-06-10 DIAGNOSIS — G43.109 MIGRAINE WITH AURA AND WITHOUT STATUS MIGRAINOSUS, NOT INTRACTABLE: ICD-10-CM

## 2024-06-10 RX ORDER — IBUPROFEN 800 MG/1
800 TABLET ORAL EVERY 12 HOURS PRN
Qty: 120 TABLET | Refills: 0 | Status: SHIPPED | OUTPATIENT
Start: 2024-06-10

## 2024-06-10 NOTE — TELEPHONE ENCOUNTER
From: Chester Walsh  To: Dr. Josef Mcknight  Sent: 6/10/2024 1:06 PM EDT  Subject: Nurtec    Hello doctor my pharmacy told me to reach out to you because my insurance will only give me 8 nurtec tablets for 30 days and I am supposed to take them every other day. Last month I tried the 8 but my migraines increased when not doing it every other day, so I did not fill them for this month. They said for your office to reach out to the insurance. Also I was wondering if I could get a RX for Motrin just until this is fixed so I can take it as an in between maybe not the 800 mg but a 600mg so it isn’t as bad on my stomach. Thank you!

## 2024-06-14 ENCOUNTER — OFFICE VISIT (OUTPATIENT)
Dept: FAMILY MEDICINE CLINIC | Age: 34
End: 2024-06-14
Payer: MEDICAID

## 2024-06-14 ENCOUNTER — TELEPHONE (OUTPATIENT)
Dept: FAMILY MEDICINE CLINIC | Age: 34
End: 2024-06-14

## 2024-06-14 VITALS
BODY MASS INDEX: 23.92 KG/M2 | WEIGHT: 130 LBS | HEART RATE: 97 BPM | SYSTOLIC BLOOD PRESSURE: 120 MMHG | OXYGEN SATURATION: 99 % | HEIGHT: 62 IN | TEMPERATURE: 97.3 F | DIASTOLIC BLOOD PRESSURE: 62 MMHG

## 2024-06-14 DIAGNOSIS — G43.109 MIGRAINE WITH AURA AND WITHOUT STATUS MIGRAINOSUS, NOT INTRACTABLE: Primary | ICD-10-CM

## 2024-06-14 PROCEDURE — 99213 OFFICE O/P EST LOW 20 MIN: CPT | Performed by: PHYSICIAN ASSISTANT

## 2024-06-14 SDOH — ECONOMIC STABILITY: FOOD INSECURITY: WITHIN THE PAST 12 MONTHS, YOU WORRIED THAT YOUR FOOD WOULD RUN OUT BEFORE YOU GOT MONEY TO BUY MORE.: NEVER TRUE

## 2024-06-14 SDOH — ECONOMIC STABILITY: FOOD INSECURITY: WITHIN THE PAST 12 MONTHS, THE FOOD YOU BOUGHT JUST DIDN'T LAST AND YOU DIDN'T HAVE MONEY TO GET MORE.: NEVER TRUE

## 2024-06-14 SDOH — ECONOMIC STABILITY: INCOME INSECURITY: HOW HARD IS IT FOR YOU TO PAY FOR THE VERY BASICS LIKE FOOD, HOUSING, MEDICAL CARE, AND HEATING?: NOT HARD AT ALL

## 2024-06-14 ASSESSMENT — PATIENT HEALTH QUESTIONNAIRE - PHQ9
SUM OF ALL RESPONSES TO PHQ QUESTIONS 1-9: 0
1. LITTLE INTEREST OR PLEASURE IN DOING THINGS: NOT AT ALL
SUM OF ALL RESPONSES TO PHQ QUESTIONS 1-9: 0
SUM OF ALL RESPONSES TO PHQ9 QUESTIONS 1 & 2: 0
2. FEELING DOWN, DEPRESSED OR HOPELESS: NOT AT ALL

## 2024-06-14 NOTE — TELEPHONE ENCOUNTER
Please let patient know that we will try and appeal denial on nurtec. It looks like she needs to fail 3 other medication/classes first. But we can try since she has been on it for a year. Does she want to hold off on Elavil still?

## 2024-08-21 ENCOUNTER — PATIENT MESSAGE (OUTPATIENT)
Dept: FAMILY MEDICINE CLINIC | Age: 34
End: 2024-08-21

## 2024-08-22 DIAGNOSIS — L73.2 HIDRADENITIS SUPPURATIVA: ICD-10-CM

## 2024-08-22 RX ORDER — SULFAMETHOXAZOLE AND TRIMETHOPRIM 800; 160 MG/1; MG/1
1 TABLET ORAL 2 TIMES DAILY
Qty: 20 TABLET | Refills: 0 | Status: SHIPPED | OUTPATIENT
Start: 2024-08-22 | End: 2024-09-01

## 2024-12-05 DIAGNOSIS — G43.109 MIGRAINE WITH AURA AND WITHOUT STATUS MIGRAINOSUS, NOT INTRACTABLE: ICD-10-CM

## 2024-12-05 RX ORDER — IBUPROFEN 800 MG/1
800 TABLET, FILM COATED ORAL EVERY 12 HOURS PRN
Qty: 120 TABLET | Refills: 0 | Status: SHIPPED | OUTPATIENT
Start: 2024-12-05

## 2024-12-05 NOTE — TELEPHONE ENCOUNTER
Name of Medication(s) Requested:  Requested Prescriptions     Pending Prescriptions Disp Refills    rimegepant sulfate 75 MG TBDP 45 tablet 0     Sig: Take 1 tablet by mouth every other day    ibuprofen (ADVIL;MOTRIN) 800 MG tablet 120 tablet 0     Sig: Take 1 tablet by mouth every 12 hours as needed for Pain       Medication is on current medication list Yes    Dosage and directions were verified? Yes    Quantity verified: 90 day supply     Pharmacy Verified?  Yes    Last Appointment:  4/18/2023    Future appts:  No future appointments.     (If no appt send self scheduling link. .REFILLAPPT)  Scheduling request sent?     [x] Yes  [] No    Does patient need updated?  [x] Yes  [] No

## 2025-06-18 DIAGNOSIS — G43.109 MIGRAINE WITH AURA AND WITHOUT STATUS MIGRAINOSUS, NOT INTRACTABLE: ICD-10-CM

## 2025-06-18 RX ORDER — IBUPROFEN 800 MG/1
800 TABLET, FILM COATED ORAL EVERY 12 HOURS PRN
Qty: 120 TABLET | Refills: 0 | Status: SHIPPED | OUTPATIENT
Start: 2025-06-18

## 2025-06-18 NOTE — TELEPHONE ENCOUNTER
Name of Medication(s) Requested:  Requested Prescriptions     Pending Prescriptions Disp Refills    ibuprofen (ADVIL;MOTRIN) 800 MG tablet 120 tablet 1     Sig: Take 1 tablet by mouth every 12 hours as needed for Pain       Medication is on current medication list Yes    Dosage and directions were verified? Yes    Quantity verified: 90 day supply     Pharmacy Verified?  Yes    Last Appointment:  4/18/2023    Future appts:  No future appointments.     (If no appt send self scheduling link. .REFILLAPPT)  Scheduling request sent?     [] Yes  [x] No    Does patient need updated?  [] Yes  [x] No